# Patient Record
(demographics unavailable — no encounter records)

---

## 2017-10-14 NOTE — ER DOCUMENT REPORT
ED General





- General


Chief Complaint: Numbness of Face


Stated Complaint: FACIAL  NUMBNESS


Time Seen by Provider: 10/14/17 09:25


Mode of Arrival: Ambulatory


Information source: Patient


Notes: 





37-year-old female presents to ED complaining of numbness to the right side of 

her face.  She states she has had this in the past but not been seen for.  

States her eye on the left side twitches but her right eye does not.  Has no 

facial droop.  Speaks in full complete sentences with no speech changes.  No 

changes in vision and denies headache.


TRAVEL OUTSIDE OF THE [place] IN LAST 30 DAYS: No





- HPI


Onset: This morning


Onset/Duration: Gradual - Woke up with numb feeling to face which has improved 

as the days gone on


Quality of pain: No pain


Severity: None


Pain Level: Denies


Associated symptoms: Other - Numbness to the right side of her face no facial 

droop no change in muscle is able to feel the difference between sharp dull and 

soft.


Exacerbated by: Denies


Relieved by: Denies


Similar symptoms previously: Yes


Recently seen / treated by doctor: No





- Related Data


Allergies/Adverse Reactions: 


 





No Known Allergies Allergy (Verified 10/14/17 09:00)


 











Past Medical History





- General


Information source: Patient





- Social History


Smoking Status: Never Smoker


Cigarette use (# per day): No


Chew tobacco use (# tins/day): No


Smoking Education Provided: No


Frequency of alcohol use: None


Drug Abuse: None


Occupation: 


Lives with: Family - Her kids


Family History: CVA, DM, Malignancy


Patient has suicidal ideation: No


Patient has homicidal ideation: No





- Past Medical History


Cardiac Medical History: Reports: None


Pulmonary Medical History: Reports: None


EENT Medical History: Reports: None


Neurological Medical History: Reports: None


Endocrine Medical History: Reports: None


Renal/ Medical History: Reports: None


Malignancy Medical History: Reports: None


GI Medical History: Reports: None


Musculoskeltal Medical History: Reports None


Skin Medical History: Reports None


Psychiatric Medical History: Reports: None


Traumatic Medical History: Reports: None


Infectious Medical History: Reports: None


Past Surgical History: Reports: Hx Gynecologic Surgery - Looposcopy to remove 

growth from cervix, Other - Cyst removed from scalp





- Immunizations


Hx Diphtheria, Pertussis, Tetanus Vaccination: No





Review of Systems





- Review of Systems


Constitutional: No symptoms reported


EENT: Other.  denies: Eye pain - Numbness to right side of face, Eye discharge, 

Blurred vision, Tearing, Ear pain, Sinus pressure, Sinus discharge


Cardiovascular: No symptoms reported


Respiratory: No symptoms reported


Gastrointestinal: No symptoms reported


Genitourinary: No symptoms reported


Female Genitourinary: No symptoms reported


Musculoskeletal: No symptoms reported


Skin: No symptoms reported


Hematologic/Lymphatic: No symptoms reported


Neurological/Psychological: Numbness - Numbness to right side of face.  denies: 

Sensory change, Weakness, Gait changes, Lost consciousness, Headaches


-: Yes All other systems reviewed and negative





Physical Exam





- Vital signs


Vitals: 


 











Temp Pulse Resp BP Pulse Ox


 


 98.5 F   72   16   118/69   98 


 


 10/14/17 09:00  10/14/17 09:00  10/14/17 09:00  10/14/17 09:00  10/14/17 09:00











Interpretation: Normal





- General


General appearance: Appears well, Alert





- HEENT


Head: Normocephalic, Atraumatic, Other - Patient able to feel the difference 

between a sharp prick and a dorsal stick, she also able to tell the difference 

between a stick and ice to tip tip touching her face.  Patient has full range 

of motion.


Eyes: Normal


Pupils: PERRL


Visual fields normal: Yes


Ears: Normal


External canal: Normal


Tympanic membrane: Normal


Sinus: Normal


Nasal: Normal


Mouth/Lips: Normal


Mucous membranes: Normal


Pharynx: Normal


Neck: Normal





- Respiratory


Respiratory status: No respiratory distress


Chest status: Nontender


Breath sounds: Normal


Chest palpation: Normal





- Cardiovascular


Rhythm: Regular


Heart sounds: Normal auscultation


Murmur: No





- Abdominal


Inspection: Normal


Distension: No distension


Bowel sounds: Normal


Tenderness: Nontender


Organomegaly: No organomegaly





- Back


Back: Normal, Nontender





- Extremities


General upper extremity: Normal inspection, Nontender, Normal color, Normal ROM

, Normal temperature


General lower extremity: Normal inspection, Nontender, Normal color, Normal ROM

, Normal temperature, Normal weight bearing.  No: Cb's sign





- Neurological


Neuro grossly intact: Yes


Cognition: Normal


Orientation: AAOx4


Liv Coma Scale Eye Opening: Spontaneous


Liv Coma Scale Verbal: Oriented


Liv Coma Scale Motor: Obeys Commands


Liv Coma Scale Total: 15


Speech: Normal


Motor strength normal: LUE, RUE, LLE, RLE


Sensory: Normal





- Psychological


Associated symptoms: Normal affect, Normal mood





- Skin


Skin Temperature: Warm


Skin Moisture: Dry


Skin Color: Normal





Course





- Re-evaluation


Re-evalutation: 





10/14/17 15:16


Discussed signs and symptoms with Dr. Vasquez.  She agreed that patient did 

not need a CT.  Will have patient follow-up with her primary doctor.  

Encouraged use of Tylenol or Motrin for the discomfort.  And follow-up with her 

primary doctor Monday or Tuesday.





- Vital Signs


Vital signs: 


 











Temp Pulse Resp BP Pulse Ox


 


 98.5 F   72   16   118/69   98 


 


 10/14/17 09:00  10/14/17 09:00  10/14/17 09:00  10/14/17 09:00  10/14/17 09:00














Discharge





- Discharge


Clinical Impression: 


 numbness sensation to right face





Condition: Stable


Disposition: HOME, SELF-CARE


Instructions:  Family Physicians / Practices


Additional Instructions: 


You were seen today for complaint of numbness to the right side of your face.





You have full sensation to bilateral sides of your face equally.





You have full range of motion to your face muscles.





You were able to distinguish sharp dull and soft to both sides of your face.





You deny any pain at this time.





You deny any change in your vision.





NORMAL EXAM AND WORKUP:


     At this time, your examination and workup show no significant abnormality.

  No significant abnormal physical findings were noted.  All laboratory, EKG, 

and imaging (x-ray, CT scans, ultrasound) studies that were ordered show no 

significant abnormality.


     Although your examination and all studies that were ordered showed no 

significant abnormal finding, there are no examinations and no studies that are 

100% accurate.  There is always the possibility that some abnormality could 

exist and not be detected with physical examination or within the limits and 

capabilities of laboratory and other studies.


     You should return or follow up as you were instructed on your visit today 

for further evaluation if your symptoms do not resolve.





FOLLOW-UP CARE:


If you have been referred to a physician for follow-up care, call the physician

s office for an appointment as you were instructed or within the next two days.

  If you experience worsening or a significant change in your symptoms, notify 

the physician immediately or return to the Emergency Department at any time for 

re-evaluation.


Forms:  Return to Work